# Patient Record
Sex: MALE | Race: ASIAN | NOT HISPANIC OR LATINO | URBAN - METROPOLITAN AREA
[De-identification: names, ages, dates, MRNs, and addresses within clinical notes are randomized per-mention and may not be internally consistent; named-entity substitution may affect disease eponyms.]

---

## 2021-05-17 ENCOUNTER — OUT OF OFFICE VISIT (OUTPATIENT)
Dept: URBAN - METROPOLITAN AREA MEDICAL CENTER 10 | Facility: MEDICAL CENTER | Age: 31
End: 2021-05-17
Payer: COMMERCIAL

## 2021-05-17 DIAGNOSIS — K92.1 BLACK STOOL: ICD-10-CM

## 2021-05-17 DIAGNOSIS — K29.60 ADENOPAPILLOMATOSIS GASTRICA: ICD-10-CM

## 2021-05-17 DIAGNOSIS — K31.89 ACQUIRED DEFORMITY OF DUODENUM: ICD-10-CM

## 2021-05-17 DIAGNOSIS — B96.81 BACTERIAL INFECTION DUE TO H. PYLORI: ICD-10-CM

## 2021-05-17 DIAGNOSIS — D62 ACUTE BLOOD LOSS ANEMIA: ICD-10-CM

## 2021-05-17 PROCEDURE — 99233 SBSQ HOSP IP/OBS HIGH 50: CPT | Performed by: INTERNAL MEDICINE

## 2021-05-17 PROCEDURE — 99223 1ST HOSP IP/OBS HIGH 75: CPT | Performed by: INTERNAL MEDICINE

## 2021-05-17 PROCEDURE — 43236 UPPR GI SCOPE W/SUBMUC INJ: CPT | Performed by: INTERNAL MEDICINE

## 2021-05-17 PROCEDURE — G8427 DOCREV CUR MEDS BY ELIG CLIN: HCPCS | Performed by: INTERNAL MEDICINE

## 2021-05-17 PROCEDURE — 43239 EGD BIOPSY SINGLE/MULTIPLE: CPT | Performed by: INTERNAL MEDICINE

## 2021-05-20 ENCOUNTER — TELEPHONE ENCOUNTER (OUTPATIENT)
Dept: URBAN - METROPOLITAN AREA CLINIC 23 | Facility: CLINIC | Age: 31
End: 2021-05-20

## 2021-05-20 ENCOUNTER — WEB ENCOUNTER (OUTPATIENT)
Dept: URBAN - METROPOLITAN AREA CLINIC 78 | Facility: CLINIC | Age: 31
End: 2021-05-20

## 2021-06-04 ENCOUNTER — LAB OUTSIDE AN ENCOUNTER (OUTPATIENT)
Dept: URBAN - METROPOLITAN AREA CLINIC 78 | Facility: CLINIC | Age: 31
End: 2021-06-04

## 2021-06-04 ENCOUNTER — OFFICE VISIT (OUTPATIENT)
Dept: URBAN - METROPOLITAN AREA CLINIC 78 | Facility: CLINIC | Age: 31
End: 2021-06-04
Payer: COMMERCIAL

## 2021-06-04 DIAGNOSIS — A04.8 HELICOBACTER PYLORI (H. PYLORI) INFECTION: ICD-10-CM

## 2021-06-04 DIAGNOSIS — K25.3 GASTRIC ULCER: ICD-10-CM

## 2021-06-04 PROCEDURE — 99203 OFFICE O/P NEW LOW 30 MIN: CPT | Performed by: INTERNAL MEDICINE

## 2021-06-04 NOTE — HPI-TODAY'S VISIT:
Patient is here in f/u He was recently admitted to the [facility] with an acute upper GI bleed EGD had shown a large  Bx showed H pylori He has been treated with triple therapy He has completed the abx He is still taking the PPI

## 2021-06-05 LAB
HEMATOCRIT: 33.2
HEMOGLOBIN: 10.8
MCH: 26.6
MCHC: 32.5
MCV: 82
NRBC: (no result)
PLATELETS: 379
RBC: 4.06
RDW: 13.6
WBC: 6.1

## 2021-06-11 ENCOUNTER — LAB OUTSIDE AN ENCOUNTER (OUTPATIENT)
Dept: URBAN - METROPOLITAN AREA CLINIC 78 | Facility: CLINIC | Age: 31
End: 2021-06-11

## 2021-06-11 ENCOUNTER — OFFICE VISIT (OUTPATIENT)
Dept: URBAN - METROPOLITAN AREA CLINIC 77 | Facility: CLINIC | Age: 31
End: 2021-06-11
Payer: COMMERCIAL

## 2021-06-11 DIAGNOSIS — A04.8 BACTERIAL INFECTION DUE TO H. PYLORI: ICD-10-CM

## 2021-06-11 PROCEDURE — 83014 H PYLORI DRUG ADMIN: CPT | Performed by: INTERNAL MEDICINE

## 2021-06-12 LAB
HEMATOCRIT: 36.3
HEMOGLOBIN: 11.4
MCH: 26.5
MCHC: 31.4
MCV: 84
NRBC: (no result)
PLATELETS: 283
RBC: 4.31
RDW: 13.5
WBC: 5.9

## 2021-06-16 ENCOUNTER — OFFICE VISIT (OUTPATIENT)
Dept: URBAN - METROPOLITAN AREA CLINIC 114 | Facility: CLINIC | Age: 31
End: 2021-06-16

## 2021-06-16 ENCOUNTER — TELEPHONE ENCOUNTER (OUTPATIENT)
Dept: URBAN - METROPOLITAN AREA CLINIC 78 | Facility: CLINIC | Age: 31
End: 2021-06-16

## 2021-06-18 ENCOUNTER — TELEPHONE ENCOUNTER (OUTPATIENT)
Dept: URBAN - METROPOLITAN AREA CLINIC 98 | Facility: CLINIC | Age: 31
End: 2021-06-18

## 2021-06-18 RX ORDER — OMEPRAZOLE MAGNESIUM, AMOXICILLIN AND RIFABUTIN 10; 250; 12.5 MG/1; MG/1; MG/1
4 CAPSULES CAPSULE, DELAYED RELEASE ORAL
Qty: 168 | Refills: 0 | OUTPATIENT
Start: 2021-06-25 | End: 2021-07-09

## 2021-06-21 ENCOUNTER — LAB OUTSIDE AN ENCOUNTER (OUTPATIENT)
Dept: URBAN - METROPOLITAN AREA TELEHEALTH 2 | Facility: TELEHEALTH | Age: 31
End: 2021-06-21

## 2021-06-21 ENCOUNTER — OFFICE VISIT (OUTPATIENT)
Dept: URBAN - METROPOLITAN AREA TELEHEALTH 2 | Facility: TELEHEALTH | Age: 31
End: 2021-06-21
Payer: COMMERCIAL

## 2021-06-21 DIAGNOSIS — K25.3 GASTRIC ULCER: ICD-10-CM

## 2021-06-21 DIAGNOSIS — A04.8 HELICOBACTER PYLORI (H. PYLORI) INFECTION: ICD-10-CM

## 2021-06-21 PROCEDURE — 99214 OFFICE O/P EST MOD 30 MIN: CPT | Performed by: INTERNAL MEDICINE

## 2021-06-21 RX ORDER — PANTOPRAZOLE SODIUM 40 MG/1
1 TABLET TABLET, DELAYED RELEASE ORAL ONCE A DAY
Qty: 90 | Refills: 1 | OUTPATIENT
Start: 2021-06-21

## 2021-06-21 NOTE — PHYSICAL EXAM NEUROLOGIC:
oriented to person, place and time , normal sensation , short and long term memory intact No Repair - Repaired With Adjacent Surgical Defect Text (Leave Blank If You Do Not Want): After obtaining clear surgical margins the defect was repaired concurrently with another surgical defect which was in close approximation.

## 2021-06-26 ENCOUNTER — ERX REFILL RESPONSE (OUTPATIENT)
Dept: URBAN - METROPOLITAN AREA CLINIC 98 | Facility: CLINIC | Age: 31
End: 2021-06-26

## 2021-06-26 RX ORDER — OMEPRAZOLE MAGNESIUM, AMOXICILLIN AND RIFABUTIN 10; 250; 12.5 MG/1; MG/1; MG/1
TAKE 4 CAPSULES BY MOUTH EVERY 8 HOURS FOR 14 DAYS CAPSULE, DELAYED RELEASE ORAL
Qty: 168 CAPSULE | Refills: 0 | OUTPATIENT

## 2021-07-02 ENCOUNTER — LAB OUTSIDE AN ENCOUNTER (OUTPATIENT)
Dept: URBAN - METROPOLITAN AREA CLINIC 78 | Facility: CLINIC | Age: 31
End: 2021-07-02

## 2021-07-02 ENCOUNTER — OFFICE VISIT (OUTPATIENT)
Dept: URBAN - METROPOLITAN AREA CLINIC 78 | Facility: CLINIC | Age: 31
End: 2021-07-02
Payer: COMMERCIAL

## 2021-07-02 VITALS
HEIGHT: 71 IN | WEIGHT: 217.2 LBS | TEMPERATURE: 98 F | DIASTOLIC BLOOD PRESSURE: 81 MMHG | BODY MASS INDEX: 30.41 KG/M2 | SYSTOLIC BLOOD PRESSURE: 136 MMHG | HEART RATE: 85 BPM

## 2021-07-02 DIAGNOSIS — A04.8 HELICOBACTER PYLORI (H. PYLORI) INFECTION: ICD-10-CM

## 2021-07-02 DIAGNOSIS — K25.3 GASTRIC ULCER: ICD-10-CM

## 2021-07-02 PROCEDURE — 99214 OFFICE O/P EST MOD 30 MIN: CPT | Performed by: INTERNAL MEDICINE

## 2021-07-02 RX ORDER — OMEPRAZOLE MAGNESIUM, AMOXICILLIN AND RIFABUTIN 10; 250; 12.5 MG/1; MG/1; MG/1
TAKE 4 CAPSULES BY MOUTH EVERY 8 HOURS FOR 14 DAYS CAPSULE, DELAYED RELEASE ORAL
Qty: 168 CAPSULE | Refills: 0 | Status: ACTIVE | COMMUNITY

## 2021-07-02 RX ORDER — PANTOPRAZOLE SODIUM 40 MG/1
1 TABLET TABLET, DELAYED RELEASE ORAL ONCE A DAY
Qty: 90 | Refills: 1 | Status: ACTIVE | COMMUNITY
Start: 2021-06-21

## 2021-07-02 NOTE — HPI-TODAY'S VISIT:
Pt is here in f/u He says he had 2 episodes of palpitations and a feeling of presyncope He did not fall or lose consciousness No N/V No hematemesis / Melena No abdo pain

## 2021-07-03 LAB
HEMATOCRIT: 37.4
HEMOGLOBIN: 11.7
MCH: 24.9
MCHC: 31.3
MCV: 80
NRBC: (no result)
PLATELETS: 335
RBC: 4.69
RDW: 13.2
WBC: 6.7

## 2021-07-12 ENCOUNTER — OFFICE VISIT (OUTPATIENT)
Dept: URBAN - METROPOLITAN AREA TELEHEALTH 2 | Facility: TELEHEALTH | Age: 31
End: 2021-07-12
Payer: COMMERCIAL

## 2021-07-12 DIAGNOSIS — A04.8 HELICOBACTER PYLORI (H. PYLORI) INFECTION: ICD-10-CM

## 2021-07-12 DIAGNOSIS — K25.3 GASTRIC ULCER: ICD-10-CM

## 2021-07-12 PROCEDURE — 99214 OFFICE O/P EST MOD 30 MIN: CPT | Performed by: INTERNAL MEDICINE

## 2021-07-12 RX ORDER — OMEPRAZOLE MAGNESIUM, AMOXICILLIN AND RIFABUTIN 10; 250; 12.5 MG/1; MG/1; MG/1
TAKE 4 CAPSULES BY MOUTH EVERY 8 HOURS FOR 14 DAYS CAPSULE, DELAYED RELEASE ORAL
Qty: 168 CAPSULE | Refills: 0 | Status: ACTIVE | COMMUNITY

## 2021-07-12 RX ORDER — PANTOPRAZOLE SODIUM 40 MG/1
1 TABLET TABLET, DELAYED RELEASE ORAL ONCE A DAY
Qty: 90 | Refills: 1 | Status: ACTIVE | COMMUNITY
Start: 2021-06-21

## 2021-09-17 ENCOUNTER — OFFICE VISIT (OUTPATIENT)
Dept: URBAN - METROPOLITAN AREA SURGERY CENTER 15 | Facility: SURGERY CENTER | Age: 31
End: 2021-09-17
Payer: COMMERCIAL

## 2021-09-17 ENCOUNTER — CLAIMS CREATED FROM THE CLAIM WINDOW (OUTPATIENT)
Dept: URBAN - METROPOLITAN AREA CLINIC 4 | Facility: CLINIC | Age: 31
End: 2021-09-17
Payer: COMMERCIAL

## 2021-09-17 DIAGNOSIS — C7A.092 MALIGNANT CARCINOID TUMOR OF STOMACH: ICD-10-CM

## 2021-09-17 DIAGNOSIS — C7A.092 MALIGNANT CARCINOID TUMOR OF THE STOMACH: ICD-10-CM

## 2021-09-17 PROCEDURE — 88305 TISSUE EXAM BY PATHOLOGIST: CPT | Performed by: PATHOLOGY

## 2021-09-17 PROCEDURE — 88342 IMHCHEM/IMCYTCHM 1ST ANTB: CPT | Performed by: PATHOLOGY

## 2021-09-17 PROCEDURE — G8907 PT DOC NO EVENTS ON DISCHARG: HCPCS | Performed by: INTERNAL MEDICINE

## 2021-09-17 PROCEDURE — 43239 EGD BIOPSY SINGLE/MULTIPLE: CPT | Performed by: INTERNAL MEDICINE

## 2021-09-17 PROCEDURE — 88341 IMHCHEM/IMCYTCHM EA ADD ANTB: CPT | Performed by: PATHOLOGY

## 2021-09-28 ENCOUNTER — OFFICE VISIT (OUTPATIENT)
Dept: URBAN - METROPOLITAN AREA CLINIC 78 | Facility: CLINIC | Age: 31
End: 2021-09-28
Payer: COMMERCIAL

## 2021-09-28 DIAGNOSIS — D3A.092 CARCINOID TUMOR OF STOMACH, UNSPECIFIED WHETHER MALIGNANT: ICD-10-CM

## 2021-09-28 DIAGNOSIS — K25.3 GASTRIC ULCER: ICD-10-CM

## 2021-09-28 PROCEDURE — 99214 OFFICE O/P EST MOD 30 MIN: CPT | Performed by: INTERNAL MEDICINE

## 2021-09-28 RX ORDER — PANTOPRAZOLE SODIUM 40 MG/1
1 TABLET TABLET, DELAYED RELEASE ORAL ONCE A DAY
Qty: 90 | Refills: 1 | Status: ACTIVE | COMMUNITY
Start: 2021-06-21

## 2021-09-28 RX ORDER — PANTOPRAZOLE SODIUM 40 MG/1
1 TABLET TABLET, DELAYED RELEASE ORAL ONCE A DAY
Qty: 90 | Refills: 0
Start: 2021-06-21

## 2021-09-28 RX ORDER — OMEPRAZOLE MAGNESIUM, AMOXICILLIN AND RIFABUTIN 10; 250; 12.5 MG/1; MG/1; MG/1
TAKE 4 CAPSULES BY MOUTH EVERY 8 HOURS FOR 14 DAYS CAPSULE, DELAYED RELEASE ORAL
Qty: 168 CAPSULE | Refills: 0 | Status: ON HOLD | COMMUNITY

## 2021-09-28 NOTE — HPI-OTHER HISTORIES
Pathology:  WELL DIFFERENTIATED NEUROENDOCRINE TUMOR (NET), LOW-GRADE (G1)/CARCINOID TUMOR. See Comment. Tumor Present at the Edge of the Biopsy. No H. Pylori Organisms, Intestinal Metaplasia or Angiolymphatic Invasion Identified. COMMENT: This biopsies show a tumor composed of atypical cells forming nests and cords, which are positive for chromogranin and synaptophysin, and a low Ki 67 expression, and the tumor is therefore best designated as well-differentiated neuroendocrine tumor, low-grade (G1) /carcinoid. Adjacent gastric mucosa demonstrates normal oxyntic mucosa without intestinal metaplasia, glandular atrophy or parietal cell hyperplasia. 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 Compared with type 1 and 2 NET, the sporadic carcinoid tumor behaves more aggressively.

## 2021-10-22 ENCOUNTER — TELEPHONE ENCOUNTER (OUTPATIENT)
Dept: URBAN - METROPOLITAN AREA SURGERY CENTER 30 | Facility: SURGERY CENTER | Age: 31
End: 2021-10-22

## 2021-10-28 ENCOUNTER — LAB OUTSIDE AN ENCOUNTER (OUTPATIENT)
Dept: URBAN - METROPOLITAN AREA SURGERY CENTER 30 | Facility: SURGERY CENTER | Age: 31
End: 2021-10-28

## 2021-11-09 ENCOUNTER — OFFICE VISIT (OUTPATIENT)
Dept: URBAN - METROPOLITAN AREA CLINIC 78 | Facility: CLINIC | Age: 31
End: 2021-11-09

## 2021-12-09 ENCOUNTER — CLAIMS CREATED FROM THE CLAIM WINDOW (OUTPATIENT)
Dept: URBAN - METROPOLITAN AREA CLINIC 4 | Facility: CLINIC | Age: 31
End: 2021-12-09
Payer: COMMERCIAL

## 2021-12-09 ENCOUNTER — OFFICE VISIT (OUTPATIENT)
Dept: URBAN - METROPOLITAN AREA SURGERY CENTER 15 | Facility: SURGERY CENTER | Age: 31
End: 2021-12-09
Payer: COMMERCIAL

## 2021-12-09 ENCOUNTER — TELEPHONE ENCOUNTER (OUTPATIENT)
Dept: URBAN - METROPOLITAN AREA CLINIC 78 | Facility: CLINIC | Age: 31
End: 2021-12-09

## 2021-12-09 DIAGNOSIS — C7A.092 MALIGNANT CARCINOID TUMOR OF STOMACH: ICD-10-CM

## 2021-12-09 DIAGNOSIS — C7A.092 MALIGNANT CARCINOID TUMOR OF THE STOMACH: ICD-10-CM

## 2021-12-09 PROCEDURE — 88341 IMHCHEM/IMCYTCHM EA ADD ANTB: CPT | Performed by: PATHOLOGY

## 2021-12-09 PROCEDURE — G8907 PT DOC NO EVENTS ON DISCHARG: HCPCS | Performed by: INTERNAL MEDICINE

## 2021-12-09 PROCEDURE — 88342 IMHCHEM/IMCYTCHM 1ST ANTB: CPT | Performed by: PATHOLOGY

## 2021-12-09 PROCEDURE — 43239 EGD BIOPSY SINGLE/MULTIPLE: CPT | Performed by: INTERNAL MEDICINE

## 2021-12-09 PROCEDURE — 88305 TISSUE EXAM BY PATHOLOGIST: CPT | Performed by: PATHOLOGY

## 2021-12-29 ENCOUNTER — OFFICE VISIT (OUTPATIENT)
Dept: URBAN - METROPOLITAN AREA CLINIC 78 | Facility: CLINIC | Age: 31
End: 2021-12-29
Payer: COMMERCIAL

## 2021-12-29 DIAGNOSIS — D3A.092 CARCINOID TUMOR OF STOMACH, UNSPECIFIED WHETHER MALIGNANT: ICD-10-CM

## 2021-12-29 DIAGNOSIS — K25.3 GASTRIC ULCER: ICD-10-CM

## 2021-12-29 PROCEDURE — 99214 OFFICE O/P EST MOD 30 MIN: CPT | Performed by: INTERNAL MEDICINE

## 2021-12-29 RX ORDER — PANTOPRAZOLE SODIUM 40 MG/1
1 TABLET TABLET, DELAYED RELEASE ORAL ONCE A DAY
Qty: 90 | Refills: 0 | Status: ACTIVE | COMMUNITY
Start: 2021-06-21

## 2021-12-29 RX ORDER — PANTOPRAZOLE SODIUM 40 MG/1
1 TABLET TABLET, DELAYED RELEASE ORAL ONCE A DAY
OUTPATIENT
Start: 2021-06-21

## 2021-12-29 RX ORDER — OMEPRAZOLE MAGNESIUM, AMOXICILLIN AND RIFABUTIN 10; 250; 12.5 MG/1; MG/1; MG/1
TAKE 4 CAPSULES BY MOUTH EVERY 8 HOURS FOR 14 DAYS CAPSULE, DELAYED RELEASE ORAL
Qty: 168 CAPSULE | Refills: 0 | Status: ON HOLD | COMMUNITY

## 2021-12-29 NOTE — HPI-TODAY'S VISIT:
Patient is here in a follow up after the recent upper endoscopy.  The findings of the procedure and the pathology were discussed with the patient.  The EGD and the bx findings were d/w him He was informed about the finding of a CARCINOID He was also informed that the H pylori is neg Patient has no new complaints Patient is taking the meds as prescribed Patient is following the lifestyle and dietary modifications as previously discussed  All questions were answered to the patient's satisfaction.

## 2022-01-14 ENCOUNTER — TELEPHONE ENCOUNTER (OUTPATIENT)
Dept: URBAN - METROPOLITAN AREA CLINIC 92 | Facility: CLINIC | Age: 32
End: 2022-01-14

## 2022-01-14 ENCOUNTER — LAB OUTSIDE AN ENCOUNTER (OUTPATIENT)
Dept: URBAN - METROPOLITAN AREA CLINIC 92 | Facility: CLINIC | Age: 32
End: 2022-01-14

## 2022-01-14 NOTE — HPI-TODAY'S VISIT:
Recived a call from DR Castellanos - he saw the patient - imaging studies do not show any disease activity outside the stomach - Dr Castellanos requesting a f/u EGD in 6 m - if it still persists - he may refer the patient for a surgical evaluation.

## 2022-01-28 ENCOUNTER — LAB OUTSIDE AN ENCOUNTER (OUTPATIENT)
Dept: URBAN - METROPOLITAN AREA CLINIC 78 | Facility: CLINIC | Age: 32
End: 2022-01-28

## 2022-01-28 ENCOUNTER — OFFICE VISIT (OUTPATIENT)
Dept: URBAN - METROPOLITAN AREA CLINIC 78 | Facility: CLINIC | Age: 32
End: 2022-01-28
Payer: COMMERCIAL

## 2022-01-28 DIAGNOSIS — K25.3 GASTRIC ULCER: ICD-10-CM

## 2022-01-28 DIAGNOSIS — A04.8 HELICOBACTER PYLORI (H. PYLORI) INFECTION: ICD-10-CM

## 2022-01-28 DIAGNOSIS — D3A.092 CARCINOID TUMOR OF STOMACH, UNSPECIFIED WHETHER MALIGNANT: ICD-10-CM

## 2022-01-28 PROCEDURE — 99214 OFFICE O/P EST MOD 30 MIN: CPT | Performed by: INTERNAL MEDICINE

## 2022-01-28 RX ORDER — OMEPRAZOLE MAGNESIUM, AMOXICILLIN AND RIFABUTIN 10; 250; 12.5 MG/1; MG/1; MG/1
TAKE 4 CAPSULES BY MOUTH EVERY 8 HOURS FOR 14 DAYS CAPSULE, DELAYED RELEASE ORAL
Qty: 168 CAPSULE | Refills: 0 | Status: ON HOLD | COMMUNITY

## 2022-01-28 NOTE — HPI-TODAY'S VISIT:
Patient is here in a follow up  He was informed about the finding of a CARCINOID He was also informed that the H pylori is neg Patient has no new complaints Patient is taking the meds as prescribed Patient is following the lifestyle and dietary modifications as previously discussed Denies anorexia / wt loss / GI bleed All questions were answered to the patient's satisfaction.

## 2022-06-02 ENCOUNTER — OFFICE VISIT (OUTPATIENT)
Dept: URBAN - METROPOLITAN AREA SURGERY CENTER 15 | Facility: SURGERY CENTER | Age: 32
End: 2022-06-02
Payer: COMMERCIAL

## 2022-06-02 ENCOUNTER — CLAIMS CREATED FROM THE CLAIM WINDOW (OUTPATIENT)
Dept: URBAN - METROPOLITAN AREA CLINIC 4 | Facility: CLINIC | Age: 32
End: 2022-06-02
Payer: COMMERCIAL

## 2022-06-02 DIAGNOSIS — C7A.092 MALIGNANT CARCINOID TUMOR OF THE STOMACH: ICD-10-CM

## 2022-06-02 DIAGNOSIS — C7A.092 MALIGNANT CARCINOID TUMOR OF STOMACH: ICD-10-CM

## 2022-06-02 PROCEDURE — 43239 EGD BIOPSY SINGLE/MULTIPLE: CPT | Performed by: INTERNAL MEDICINE

## 2022-06-02 PROCEDURE — 88305 TISSUE EXAM BY PATHOLOGIST: CPT | Performed by: PATHOLOGY

## 2022-06-02 PROCEDURE — 88341 IMHCHEM/IMCYTCHM EA ADD ANTB: CPT | Performed by: PATHOLOGY

## 2022-06-02 PROCEDURE — G8907 PT DOC NO EVENTS ON DISCHARG: HCPCS | Performed by: INTERNAL MEDICINE

## 2022-06-02 PROCEDURE — 88342 IMHCHEM/IMCYTCHM 1ST ANTB: CPT | Performed by: PATHOLOGY

## 2022-07-08 ENCOUNTER — OFFICE VISIT (OUTPATIENT)
Dept: URBAN - METROPOLITAN AREA SURGERY CENTER 15 | Facility: SURGERY CENTER | Age: 32
End: 2022-07-08

## 2022-07-20 ENCOUNTER — OFFICE VISIT (OUTPATIENT)
Dept: URBAN - METROPOLITAN AREA CLINIC 78 | Facility: CLINIC | Age: 32
End: 2022-07-20

## 2022-07-25 ENCOUNTER — OFFICE VISIT (OUTPATIENT)
Dept: URBAN - METROPOLITAN AREA CLINIC 78 | Facility: CLINIC | Age: 32
End: 2022-07-25
Payer: COMMERCIAL

## 2022-07-25 VITALS
WEIGHT: 240.6 LBS | HEIGHT: 71 IN | BODY MASS INDEX: 33.68 KG/M2 | RESPIRATION RATE: 16 BRPM | DIASTOLIC BLOOD PRESSURE: 85 MMHG | SYSTOLIC BLOOD PRESSURE: 149 MMHG | HEART RATE: 87 BPM | TEMPERATURE: 98.6 F

## 2022-07-25 DIAGNOSIS — K25.3 GASTRIC ULCER: ICD-10-CM

## 2022-07-25 DIAGNOSIS — A04.8 HELICOBACTER PYLORI (H. PYLORI) INFECTION: ICD-10-CM

## 2022-07-25 DIAGNOSIS — D3A.092 CARCINOID TUMOR OF STOMACH, UNSPECIFIED WHETHER MALIGNANT: ICD-10-CM

## 2022-07-25 PROCEDURE — 99214 OFFICE O/P EST MOD 30 MIN: CPT | Performed by: INTERNAL MEDICINE

## 2022-07-25 RX ORDER — OMEPRAZOLE MAGNESIUM, AMOXICILLIN AND RIFABUTIN 10; 250; 12.5 MG/1; MG/1; MG/1
TAKE 4 CAPSULES BY MOUTH EVERY 8 HOURS FOR 14 DAYS CAPSULE, DELAYED RELEASE ORAL
Qty: 168 CAPSULE | Refills: 0 | Status: ON HOLD | COMMUNITY

## 2022-07-25 NOTE — HPI-TODAY'S VISIT:
Patient is here in f/u He is asymptomatic EGD and bx d/w pt and his family Explained to him the persistent findings of the Carcinoid on the ulcer bx and the possible spread along the edges Pt has been spoken with by Dr Castellanos Pt has been referred to Dr En May A gastric resection has been recommended Pt is very anxious

## 2022-08-02 ENCOUNTER — TELEPHONE ENCOUNTER (OUTPATIENT)
Dept: URBAN - METROPOLITAN AREA CLINIC 63 | Facility: CLINIC | Age: 32
End: 2022-08-02

## 2022-12-02 ENCOUNTER — LAB OUTSIDE AN ENCOUNTER (OUTPATIENT)
Dept: URBAN - METROPOLITAN AREA CLINIC 78 | Facility: CLINIC | Age: 32
End: 2022-12-02

## 2022-12-02 ENCOUNTER — OFFICE VISIT (OUTPATIENT)
Dept: URBAN - METROPOLITAN AREA CLINIC 78 | Facility: CLINIC | Age: 32
End: 2022-12-02
Payer: COMMERCIAL

## 2022-12-02 VITALS
BODY MASS INDEX: 32.11 KG/M2 | WEIGHT: 229.4 LBS | SYSTOLIC BLOOD PRESSURE: 142 MMHG | HEIGHT: 71 IN | HEART RATE: 73 BPM | DIASTOLIC BLOOD PRESSURE: 92 MMHG | TEMPERATURE: 98.1 F

## 2022-12-02 DIAGNOSIS — D3A.092 CARCINOID TUMOR OF STOMACH, UNSPECIFIED WHETHER MALIGNANT: ICD-10-CM

## 2022-12-02 DIAGNOSIS — A04.8 HELICOBACTER PYLORI (H. PYLORI) INFECTION: ICD-10-CM

## 2022-12-02 DIAGNOSIS — K25.3 GASTRIC ULCER: ICD-10-CM

## 2022-12-02 PROCEDURE — 99214 OFFICE O/P EST MOD 30 MIN: CPT | Performed by: INTERNAL MEDICINE

## 2022-12-02 RX ORDER — OMEPRAZOLE MAGNESIUM, AMOXICILLIN AND RIFABUTIN 10; 250; 12.5 MG/1; MG/1; MG/1
TAKE 4 CAPSULES BY MOUTH EVERY 8 HOURS FOR 14 DAYS CAPSULE, DELAYED RELEASE ORAL
Qty: 168 CAPSULE | Refills: 0 | Status: ON HOLD | COMMUNITY

## 2022-12-02 NOTE — HPI-TODAY'S VISIT:
Patient is here in f/u He is asymptomatic He was recommended a visit with Dr En May he is waiting for his parents to come from [facility] and then he will schedule his surgery  Last visit EGD and bx d/w pt and his family Explained to him the persistent findings of the Carcinoid on the ulcer bx and the possible spread along the edges Pt has been spoken with by Dr Castellanos Pt has been referred to Dr En May A gastric resection has been recommended Pt is very anxious

## 2023-01-05 ENCOUNTER — CLAIMS CREATED FROM THE CLAIM WINDOW (OUTPATIENT)
Dept: URBAN - METROPOLITAN AREA SURGERY CENTER 15 | Facility: SURGERY CENTER | Age: 33
End: 2023-01-05
Payer: COMMERCIAL

## 2023-01-05 ENCOUNTER — CLAIMS CREATED FROM THE CLAIM WINDOW (OUTPATIENT)
Dept: URBAN - METROPOLITAN AREA SURGERY CENTER 15 | Facility: SURGERY CENTER | Age: 33
End: 2023-01-05

## 2023-01-05 ENCOUNTER — CLAIMS CREATED FROM THE CLAIM WINDOW (OUTPATIENT)
Dept: URBAN - METROPOLITAN AREA CLINIC 4 | Facility: CLINIC | Age: 33
End: 2023-01-05
Payer: COMMERCIAL

## 2023-01-05 DIAGNOSIS — Z85.028 HISTORY OF GASTRIC CANCER: ICD-10-CM

## 2023-01-05 DIAGNOSIS — K31.A0 GASTRIC INTESTINAL METAPLASIA, UNSPECIFIED: ICD-10-CM

## 2023-01-05 DIAGNOSIS — K29.60 ADENOPAPILLOMATOSIS GASTRICA: ICD-10-CM

## 2023-01-05 PROCEDURE — 88305 TISSUE EXAM BY PATHOLOGIST: CPT | Performed by: PATHOLOGY

## 2023-01-05 PROCEDURE — 88312 SPECIAL STAINS GROUP 1: CPT | Performed by: PATHOLOGY

## 2023-01-05 PROCEDURE — 43239 EGD BIOPSY SINGLE/MULTIPLE: CPT | Performed by: INTERNAL MEDICINE

## 2023-01-05 PROCEDURE — 43236 UPPR GI SCOPE W/SUBMUC INJ: CPT | Performed by: INTERNAL MEDICINE

## 2023-01-05 PROCEDURE — G8907 PT DOC NO EVENTS ON DISCHARG: HCPCS | Performed by: INTERNAL MEDICINE

## 2023-01-05 PROCEDURE — 88342 IMHCHEM/IMCYTCHM 1ST ANTB: CPT | Performed by: PATHOLOGY

## 2023-01-27 ENCOUNTER — OFFICE VISIT (OUTPATIENT)
Dept: URBAN - METROPOLITAN AREA CLINIC 78 | Facility: CLINIC | Age: 33
End: 2023-01-27
Payer: COMMERCIAL

## 2023-01-27 ENCOUNTER — LAB OUTSIDE AN ENCOUNTER (OUTPATIENT)
Dept: URBAN - METROPOLITAN AREA CLINIC 78 | Facility: CLINIC | Age: 33
End: 2023-01-27

## 2023-01-27 VITALS
DIASTOLIC BLOOD PRESSURE: 80 MMHG | HEART RATE: 70 BPM | BODY MASS INDEX: 31.92 KG/M2 | TEMPERATURE: 98.5 F | WEIGHT: 228 LBS | SYSTOLIC BLOOD PRESSURE: 142 MMHG | HEIGHT: 71 IN | RESPIRATION RATE: 17 BRPM

## 2023-01-27 DIAGNOSIS — K25.3 GASTRIC ULCER: ICD-10-CM

## 2023-01-27 DIAGNOSIS — A04.8 HELICOBACTER PYLORI (H. PYLORI) INFECTION: ICD-10-CM

## 2023-01-27 DIAGNOSIS — D3A.092 CARCINOID TUMOR OF STOMACH, UNSPECIFIED WHETHER MALIGNANT: ICD-10-CM

## 2023-01-27 PROCEDURE — 99214 OFFICE O/P EST MOD 30 MIN: CPT | Performed by: INTERNAL MEDICINE

## 2023-01-27 RX ORDER — OMEPRAZOLE MAGNESIUM, AMOXICILLIN AND RIFABUTIN 10; 250; 12.5 MG/1; MG/1; MG/1
TAKE 4 CAPSULES BY MOUTH EVERY 8 HOURS FOR 14 DAYS CAPSULE, DELAYED RELEASE ORAL
Qty: 168 CAPSULE | Refills: 0 | Status: ON HOLD | COMMUNITY

## 2023-01-27 NOTE — HPI-TODAY'S VISIT:
Patient is here in f/u He is asymptomatic   EGD and bx d/w pt The lesin has decreased in size Biopsy does not show any e/o Carcinoid  D/W Dr Castellanos on the phone in the patients presence

## 2023-07-20 ENCOUNTER — OFFICE VISIT (OUTPATIENT)
Dept: URBAN - METROPOLITAN AREA SURGERY CENTER 15 | Facility: SURGERY CENTER | Age: 33
End: 2023-07-20
Payer: COMMERCIAL

## 2023-07-20 ENCOUNTER — CLAIMS CREATED FROM THE CLAIM WINDOW (OUTPATIENT)
Dept: URBAN - METROPOLITAN AREA CLINIC 4 | Facility: CLINIC | Age: 33
End: 2023-07-20
Payer: COMMERCIAL

## 2023-07-20 DIAGNOSIS — K29.60 ADENOPAPILLOMATOSIS GASTRICA: ICD-10-CM

## 2023-07-20 DIAGNOSIS — K31.A0 GASTRIC INTESTINAL METAPLASIA, UNSPECIFIED: ICD-10-CM

## 2023-07-20 PROCEDURE — 88341 IMHCHEM/IMCYTCHM EA ADD ANTB: CPT | Performed by: PATHOLOGY

## 2023-07-20 PROCEDURE — G8907 PT DOC NO EVENTS ON DISCHARG: HCPCS | Performed by: INTERNAL MEDICINE

## 2023-07-20 PROCEDURE — 88305 TISSUE EXAM BY PATHOLOGIST: CPT | Performed by: PATHOLOGY

## 2023-07-20 PROCEDURE — 43239 EGD BIOPSY SINGLE/MULTIPLE: CPT | Performed by: INTERNAL MEDICINE

## 2023-07-20 PROCEDURE — 88342 IMHCHEM/IMCYTCHM 1ST ANTB: CPT | Performed by: PATHOLOGY

## 2023-08-11 ENCOUNTER — OFFICE VISIT (OUTPATIENT)
Dept: URBAN - METROPOLITAN AREA CLINIC 78 | Facility: CLINIC | Age: 33
End: 2023-08-11
Payer: COMMERCIAL

## 2023-08-11 VITALS
HEIGHT: 71 IN | RESPIRATION RATE: 14 BRPM | WEIGHT: 224.8 LBS | BODY MASS INDEX: 31.47 KG/M2 | HEART RATE: 75 BPM | TEMPERATURE: 98.3 F | SYSTOLIC BLOOD PRESSURE: 142 MMHG | DIASTOLIC BLOOD PRESSURE: 87 MMHG

## 2023-08-11 DIAGNOSIS — D3A.092 CARCINOID TUMOR OF STOMACH, UNSPECIFIED WHETHER MALIGNANT: ICD-10-CM

## 2023-08-11 DIAGNOSIS — K25.3 GASTRIC ULCER: ICD-10-CM

## 2023-08-11 DIAGNOSIS — A04.8 HELICOBACTER PYLORI (H. PYLORI) INFECTION: ICD-10-CM

## 2023-08-11 PROCEDURE — 99214 OFFICE O/P EST MOD 30 MIN: CPT | Performed by: INTERNAL MEDICINE

## 2023-08-11 RX ORDER — OMEPRAZOLE MAGNESIUM, AMOXICILLIN AND RIFABUTIN 10; 250; 12.5 MG/1; MG/1; MG/1
TAKE 4 CAPSULES BY MOUTH EVERY 8 HOURS FOR 14 DAYS CAPSULE, DELAYED RELEASE ORAL
Qty: 168 CAPSULE | Refills: 0 | Status: ON HOLD | COMMUNITY

## 2023-11-03 ENCOUNTER — OFFICE VISIT (OUTPATIENT)
Dept: URBAN - METROPOLITAN AREA CLINIC 78 | Facility: CLINIC | Age: 33
End: 2023-11-03

## 2023-12-22 ENCOUNTER — OFFICE VISIT (OUTPATIENT)
Dept: URBAN - METROPOLITAN AREA CLINIC 78 | Facility: CLINIC | Age: 33
End: 2023-12-22

## 2023-12-29 ENCOUNTER — OFFICE VISIT (OUTPATIENT)
Dept: URBAN - METROPOLITAN AREA CLINIC 78 | Facility: CLINIC | Age: 33
End: 2023-12-29

## 2024-01-26 ENCOUNTER — OFFICE VISIT (OUTPATIENT)
Dept: URBAN - METROPOLITAN AREA CLINIC 78 | Facility: CLINIC | Age: 34
End: 2024-01-26
Payer: COMMERCIAL

## 2024-01-26 VITALS
RESPIRATION RATE: 14 BRPM | SYSTOLIC BLOOD PRESSURE: 128 MMHG | TEMPERATURE: 99 F | BODY MASS INDEX: 32.06 KG/M2 | HEIGHT: 71 IN | WEIGHT: 229 LBS | HEART RATE: 79 BPM | DIASTOLIC BLOOD PRESSURE: 90 MMHG

## 2024-01-26 DIAGNOSIS — K25.3 GASTRIC ULCER: ICD-10-CM

## 2024-01-26 DIAGNOSIS — A04.8 HELICOBACTER PYLORI (H. PYLORI) INFECTION: ICD-10-CM

## 2024-01-26 DIAGNOSIS — D3A.092 BENIGN CARCINOID TUMOR OF STOMACH: ICD-10-CM

## 2024-01-26 PROCEDURE — 99214 OFFICE O/P EST MOD 30 MIN: CPT | Performed by: INTERNAL MEDICINE

## 2024-01-26 RX ORDER — OMEPRAZOLE MAGNESIUM, AMOXICILLIN AND RIFABUTIN 10; 250; 12.5 MG/1; MG/1; MG/1
TAKE 4 CAPSULES BY MOUTH EVERY 8 HOURS FOR 14 DAYS CAPSULE, DELAYED RELEASE ORAL
Qty: 168 CAPSULE | Refills: 0 | Status: ON HOLD | COMMUNITY

## 2024-01-26 NOTE — HPI-TODAY'S VISIT:
Patient was last seen in AUgust 2023 He had been recoomended a f/u with DR En May - he is still waiting for an appointment. He denies any new symptoms Denies bleeding.

## 2024-02-23 ENCOUNTER — OV EP (OUTPATIENT)
Dept: URBAN - METROPOLITAN AREA CLINIC 78 | Facility: CLINIC | Age: 34
End: 2024-02-23
Payer: COMMERCIAL

## 2024-02-23 VITALS
BODY MASS INDEX: 31.92 KG/M2 | HEART RATE: 80 BPM | DIASTOLIC BLOOD PRESSURE: 81 MMHG | HEIGHT: 71 IN | SYSTOLIC BLOOD PRESSURE: 129 MMHG | RESPIRATION RATE: 15 BRPM | TEMPERATURE: 98.5 F | WEIGHT: 228 LBS

## 2024-02-23 DIAGNOSIS — A04.8 BACTERIAL INFECTION DUE TO H. PYLORI: ICD-10-CM

## 2024-02-23 DIAGNOSIS — K25.3 GASTRIC ULCER: ICD-10-CM

## 2024-02-23 DIAGNOSIS — D3A.092 CARCINOID TUMOR OF STOMACH: ICD-10-CM

## 2024-02-23 PROCEDURE — 99214 OFFICE O/P EST MOD 30 MIN: CPT | Performed by: INTERNAL MEDICINE

## 2024-02-23 RX ORDER — OMEPRAZOLE MAGNESIUM, AMOXICILLIN AND RIFABUTIN 10; 250; 12.5 MG/1; MG/1; MG/1
TAKE 4 CAPSULES BY MOUTH EVERY 8 HOURS FOR 14 DAYS CAPSULE, DELAYED RELEASE ORAL
Qty: 168 CAPSULE | Refills: 0 | Status: ON HOLD | COMMUNITY

## 2024-02-23 NOTE — HPI-TODAY'S VISIT:
Patient is here in f/u  He was scheduled to see Dr En Goyal A routine CT showed a small lesion in the dome of the liver MRI of the liver shows a cyst and no e/o mass / mets Patient has no new symptoms  CT A/P:  IMPRESSION:1.  No discrete gastric mass identified, slightly limited by motion and presence of positive oral contrast.2.  No definite metastatic disease in the abdomen and pelvis.3.  Well-circumscribed subcentimeter hypodensity in the hepatic dome is too small to characterize, although may represent a cyst. Recommend attention on follow-up as this was not definitively seen on prior CT from 2022 CT.  CT Chest:  IMPRESSION:1. Stable examination. No new worrisome lesions in interval.  MRI A/P:  IMPRESSION:The subcentimeter hepatic dome lesion corresponds to a simple cyst. No evidence of recurrent or metastatic disease in the abdomen.

## 2024-03-15 NOTE — PHYSICAL EXAM NECK/THYROID:
normal appearance , without tenderness upon palpation , no deformities , trachea midline , Thyroid normal size , no thyroid nodules , no masses , no JVD , thyroid nontender [Time Spent: ___ minutes] : I have spent [unfilled] minutes of time on the encounter.

## 2024-03-20 ENCOUNTER — OV EP (OUTPATIENT)
Dept: URBAN - METROPOLITAN AREA CLINIC 78 | Facility: CLINIC | Age: 34
End: 2024-03-20

## 2024-04-05 ENCOUNTER — LAB (OUTPATIENT)
Dept: URBAN - METROPOLITAN AREA CLINIC 78 | Facility: CLINIC | Age: 34
End: 2024-04-05

## 2024-04-05 ENCOUNTER — OV EP (OUTPATIENT)
Dept: URBAN - METROPOLITAN AREA CLINIC 78 | Facility: CLINIC | Age: 34
End: 2024-04-05
Payer: COMMERCIAL

## 2024-04-05 VITALS
DIASTOLIC BLOOD PRESSURE: 83 MMHG | TEMPERATURE: 98.1 F | BODY MASS INDEX: 32.06 KG/M2 | SYSTOLIC BLOOD PRESSURE: 134 MMHG | HEART RATE: 18 BPM | RESPIRATION RATE: 14 BRPM | WEIGHT: 229 LBS | HEIGHT: 71 IN

## 2024-04-05 DIAGNOSIS — K25.3 GASTRIC ULCER: ICD-10-CM

## 2024-04-05 DIAGNOSIS — A04.8 OTHER SPECIFIED BACTERIAL INTESTINAL INFECTIONS: ICD-10-CM

## 2024-04-05 DIAGNOSIS — D3A.092 CARCINOID TUMOR OF STOMACH, UNSPECIFIED WHETHER MALIGNANT: ICD-10-CM

## 2024-04-05 PROCEDURE — 99214 OFFICE O/P EST MOD 30 MIN: CPT | Performed by: INTERNAL MEDICINE

## 2024-04-05 RX ORDER — OMEPRAZOLE MAGNESIUM, AMOXICILLIN AND RIFABUTIN 10; 250; 12.5 MG/1; MG/1; MG/1
TAKE 4 CAPSULES BY MOUTH EVERY 8 HOURS FOR 14 DAYS CAPSULE, DELAYED RELEASE ORAL
Qty: 168 CAPSULE | Refills: 0 | Status: ON HOLD | COMMUNITY

## 2024-04-05 NOTE — HPI-TODAY'S VISIT:
Patient is here in f/u  He was scheduled to see Dr En Goyal A routine CT showed a small lesion in the dome of the liver MRI of the liver shows a cyst and no e/o mass / mets Patient has no new symptoms  CT A/P:  IMPRESSION:1.  No discrete gastric mass identified, slightly limited by motion and presence of positive oral contrast.2.  No definite metastatic disease in the abdomen and pelvis.3.  Well-circumscribed subcentimeter hypodensity in the hepatic dome is too small to characterize, although may represent a cyst. Recommend attention on follow-up as this was not definitively seen on prior CT from 2022 CT.  CT Chest:  IMPRESSION:1. Stable examination. No new worrisome lesions in interval.  MRI A/P:  IMPRESSION:The subcentimeter hepatic dome lesion corresponds to a simple cyst. No evidence of recurrent or metastatic disease in the abdomen.  -----------------------------------------------------  4/5/24  Patient is here in f/u Denies any new symptoms No change in appetite / wt loss NO GI bleed

## 2024-04-26 ENCOUNTER — OV EP (OUTPATIENT)
Dept: URBAN - METROPOLITAN AREA CLINIC 78 | Facility: CLINIC | Age: 34
End: 2024-04-26

## 2024-05-03 ENCOUNTER — OFFICE VISIT (OUTPATIENT)
Dept: URBAN - METROPOLITAN AREA CLINIC 78 | Facility: CLINIC | Age: 34
End: 2024-05-03

## 2024-07-22 ENCOUNTER — OFFICE VISIT (OUTPATIENT)
Dept: URBAN - METROPOLITAN AREA CLINIC 78 | Facility: CLINIC | Age: 34
End: 2024-07-22

## 2024-07-29 ENCOUNTER — LAB OUTSIDE AN ENCOUNTER (OUTPATIENT)
Dept: URBAN - METROPOLITAN AREA CLINIC 78 | Facility: CLINIC | Age: 34
End: 2024-07-29

## 2024-07-29 ENCOUNTER — OFFICE VISIT (OUTPATIENT)
Dept: URBAN - METROPOLITAN AREA CLINIC 78 | Facility: CLINIC | Age: 34
End: 2024-07-29
Payer: COMMERCIAL

## 2024-07-29 ENCOUNTER — DASHBOARD ENCOUNTERS (OUTPATIENT)
Age: 34
End: 2024-07-29

## 2024-07-29 VITALS
TEMPERATURE: 98.1 F | HEART RATE: 73 BPM | DIASTOLIC BLOOD PRESSURE: 81 MMHG | HEIGHT: 71 IN | SYSTOLIC BLOOD PRESSURE: 128 MMHG | BODY MASS INDEX: 32.59 KG/M2 | WEIGHT: 232.8 LBS | RESPIRATION RATE: 14 BRPM

## 2024-07-29 DIAGNOSIS — K76.89 LESION OF LIVER: ICD-10-CM

## 2024-07-29 DIAGNOSIS — A04.8 HELICOBACTER PYLORI (H. PYLORI) INFECTION: ICD-10-CM

## 2024-07-29 DIAGNOSIS — D3A.092 CARCINOID TUMOR OF STOMACH, UNSPECIFIED WHETHER MALIGNANT: ICD-10-CM

## 2024-07-29 DIAGNOSIS — K25.3 GASTRIC ULCER: ICD-10-CM

## 2024-07-29 PROBLEM — 300331000: Status: ACTIVE | Noted: 2024-07-29

## 2024-07-29 PROCEDURE — 99214 OFFICE O/P EST MOD 30 MIN: CPT | Performed by: INTERNAL MEDICINE

## 2024-07-29 RX ORDER — OMEPRAZOLE MAGNESIUM, AMOXICILLIN AND RIFABUTIN 10; 250; 12.5 MG/1; MG/1; MG/1
TAKE 4 CAPSULES BY MOUTH EVERY 8 HOURS FOR 14 DAYS CAPSULE, DELAYED RELEASE ORAL
Qty: 168 CAPSULE | Refills: 0 | Status: ON HOLD | COMMUNITY

## 2024-07-29 NOTE — HPI-TODAY'S VISIT:
Patient is here in follow up He is asymptomatic  He has been following up with Dr. Castellanos and Dr. En May  PET -July 2024  Cu-64 DOTA-ALVARENGA PET/CT from skull vertex to mid thighs demonstrates:  1. Small focus of tracer avidity in the lateral aspect of the stomach possibly representing active tumor. 2. Subtle foci of increased activity within the left lobe of liver is are nonspecific. Follow-up MRI may be warranted. 3. No other foci of abnormal tracer localization are identified.  MRI  in 2/2024  IMPRESSION:  The subcentimeter hepatic dome lesion corresponds to a simple cyst. No evidence of recurrent or metastatic disease in the abdomen.

## 2024-08-23 ENCOUNTER — TELEPHONE ENCOUNTER (OUTPATIENT)
Dept: URBAN - METROPOLITAN AREA CLINIC 78 | Facility: CLINIC | Age: 34
End: 2024-08-23

## 2024-08-23 ENCOUNTER — WEB ENCOUNTER (OUTPATIENT)
Dept: URBAN - METROPOLITAN AREA CLINIC 78 | Facility: CLINIC | Age: 34
End: 2024-08-23

## 2024-09-04 ENCOUNTER — OFFICE VISIT (OUTPATIENT)
Dept: URBAN - METROPOLITAN AREA CLINIC 78 | Facility: CLINIC | Age: 34
End: 2024-09-04

## 2024-09-12 ENCOUNTER — TELEPHONE ENCOUNTER (OUTPATIENT)
Dept: URBAN - METROPOLITAN AREA CLINIC 78 | Facility: CLINIC | Age: 34
End: 2024-09-12

## 2024-12-13 ENCOUNTER — OFFICE VISIT (OUTPATIENT)
Dept: URBAN - METROPOLITAN AREA CLINIC 78 | Facility: CLINIC | Age: 34
End: 2024-12-13
Payer: COMMERCIAL

## 2024-12-13 VITALS
DIASTOLIC BLOOD PRESSURE: 92 MMHG | SYSTOLIC BLOOD PRESSURE: 136 MMHG | HEIGHT: 71 IN | HEART RATE: 73 BPM | TEMPERATURE: 98.1 F | BODY MASS INDEX: 33.04 KG/M2 | WEIGHT: 236 LBS

## 2024-12-13 DIAGNOSIS — A04.8 HELICOBACTER PYLORI (H. PYLORI) INFECTION: ICD-10-CM

## 2024-12-13 DIAGNOSIS — K76.9 LIVER LESION: ICD-10-CM

## 2024-12-13 DIAGNOSIS — D3A.092 CARCINOID TUMOR OF STOMACH, UNSPECIFIED WHETHER MALIGNANT: ICD-10-CM

## 2024-12-13 DIAGNOSIS — K25.3 GASTRIC ULCER: ICD-10-CM

## 2024-12-13 PROCEDURE — 99214 OFFICE O/P EST MOD 30 MIN: CPT | Performed by: INTERNAL MEDICINE

## 2024-12-13 RX ORDER — OMEPRAZOLE MAGNESIUM, AMOXICILLIN AND RIFABUTIN 10; 250; 12.5 MG/1; MG/1; MG/1
TAKE 4 CAPSULES BY MOUTH EVERY 8 HOURS FOR 14 DAYS CAPSULE, DELAYED RELEASE ORAL
Qty: 168 CAPSULE | Refills: 0 | Status: ON HOLD | COMMUNITY

## 2024-12-13 NOTE — HPI-TODAY'S VISIT:
Patient is here in f/u He does not have any new symptoms Denies change in appetite or wt   Recent MRI:  No discrete gastric/hepatic lesion to correspond to foci of activity on recent. DOTATATE/PET. Consider follow-up MRI in 3-6 months to reassess

## 2025-01-28 ENCOUNTER — LAB OUTSIDE AN ENCOUNTER (OUTPATIENT)
Dept: URBAN - METROPOLITAN AREA CLINIC 78 | Facility: CLINIC | Age: 35
End: 2025-01-28

## 2025-02-20 ENCOUNTER — OFFICE VISIT (OUTPATIENT)
Dept: URBAN - METROPOLITAN AREA SURGERY CENTER 15 | Facility: SURGERY CENTER | Age: 35
End: 2025-02-20

## 2025-06-03 ENCOUNTER — LAB OUTSIDE AN ENCOUNTER (OUTPATIENT)
Dept: URBAN - METROPOLITAN AREA CLINIC 78 | Facility: CLINIC | Age: 35
End: 2025-06-03